# Patient Record
Sex: MALE | URBAN - METROPOLITAN AREA
[De-identification: names, ages, dates, MRNs, and addresses within clinical notes are randomized per-mention and may not be internally consistent; named-entity substitution may affect disease eponyms.]

---

## 2020-08-24 ENCOUNTER — OFFICE VISIT (OUTPATIENT)
Dept: URBAN - METROPOLITAN AREA CLINIC 76 | Facility: CLINIC | Age: 30
End: 2020-08-24
Payer: COMMERCIAL

## 2020-08-24 DIAGNOSIS — H52.03 HYPERMETROPIA, BILATERAL: Primary | ICD-10-CM

## 2020-08-24 PROCEDURE — 92004 COMPRE OPH EXAM NEW PT 1/>: CPT | Performed by: OPTOMETRIST

## 2020-08-24 ASSESSMENT — KERATOMETRY
OS: 41.50
OD: 41.63

## 2020-08-24 ASSESSMENT — INTRAOCULAR PRESSURE
OD: 14
OS: 14

## 2020-08-24 ASSESSMENT — VISUAL ACUITY
OS: 20/20
OD: 20/20

## 2020-08-24 NOTE — IMPRESSION/PLAN
Impression: Hypermetropia, bilateral: H52.03. Bilateral. Plan: Discussed condition. New mrx given today. Pt to call with any concerns.